# Patient Record
Sex: FEMALE | Race: WHITE | NOT HISPANIC OR LATINO | Employment: OTHER | URBAN - METROPOLITAN AREA
[De-identification: names, ages, dates, MRNs, and addresses within clinical notes are randomized per-mention and may not be internally consistent; named-entity substitution may affect disease eponyms.]

---

## 2017-02-07 ENCOUNTER — HOSPITAL ENCOUNTER (OUTPATIENT)
Dept: CT IMAGING | Facility: HOSPITAL | Age: 82
Discharge: HOME | End: 2017-02-07
Attending: INTERNAL MEDICINE

## 2017-02-07 ENCOUNTER — HOSPITAL ENCOUNTER (OUTPATIENT)
Dept: CARDIOLOGY | Facility: HOSPITAL | Age: 82
Discharge: HOME | End: 2017-02-07
Attending: INTERNAL MEDICINE

## 2017-02-07 DIAGNOSIS — Z01.818 EXAMINATION PRIOR TO CHEMOTHERAPY: ICD-10-CM

## 2017-02-07 DIAGNOSIS — C50.919 BREAST CANCER (H): ICD-10-CM

## 2017-02-07 LAB
AORTIC ROOT: 2.6 CM
AORTIC ROOT: 2.6 CM
AORTIC VALVE MEAN VELOCITY: 117 CM/S
AR DECEL SLOPE: 3000 MM/S2
AR PEAK VELOCITY: 411 CM/S
AV DIMENSIONLESS INDEX VTI: 0.8
AV MEAN GRADIENT: 6 MMHG
AV PEAK GRADIENT: 9.7 MMHG
AV REGURGITANT PEAK GRADIENT: 67.6 MMHG
AV REGURGITATION PRESSURE HALF TIME: 402 MS
AV VALVE AREA: 2.1 CM2
AV VELOCITY RATIO: 0.8
BSA FOR ECHO PROCEDURE: 1.51 M2
CV ECHO HEIGHT: 65 IN
CV ECHO WEIGHT: 110 LBS
DOP CALC AO PEAK VEL: 156 CM/S
DOP CALC AO VTI: 38.9 CM
DOP CALC LVOT AREA: 2.54 CM2
DOP CALC LVOT DIAMETER: 1.8 CM
DOP CALC LVOT PEAK VEL: 124 CM/S
DOP CALC LVOT STROKE VOLUME: 81.4 CM3
DOP CALCLVOT PEAK VEL VTI: 32 CM
EJECTION FRACTION: 59 % (ref 55–75)
FRACTIONAL SHORTENING: 25.6 % (ref 28–44)
INTERVENTRICULAR SEPTUM IN END DIASTOLE: 0.9 CM (ref 0.6–0.9)
IVS/PW RATIO: 1
LA AREA 1: 14.9 CM2
LA AREA 2: 9.14 CM2
LEFT ATRIUM LENGTH: 4.59 CM
LEFT ATRIUM SIZE: 2.8 CM
LEFT ATRIUM VOLUME INDEX: 16.7 ML/M2
LEFT ATRIUM VOLUME: 25.2 CM3
LEFT VENTRICLE CARDIAC INDEX: 4.1 L/MIN/M2
LEFT VENTRICLE CARDIAC OUTPUT: 6.2 L/MIN
LEFT VENTRICLE DIASTOLIC VOLUME INDEX: 40.4 CM3/M2 (ref 34–74)
LEFT VENTRICLE DIASTOLIC VOLUME: 61 CM3 (ref 46–106)
LEFT VENTRICLE HEART RATE: 76 BPM
LEFT VENTRICLE MASS INDEX: 69.8 G/M2
LEFT VENTRICLE SYSTOLIC VOLUME INDEX: 16.6 CM3/M2 (ref 11–31)
LEFT VENTRICLE SYSTOLIC VOLUME: 25 CM3 (ref 14–42)
LEFT VENTRICULAR INTERNAL DIMENSION IN DIASTOLE: 3.9 CM (ref 3.8–5.2)
LEFT VENTRICULAR INTERNAL DIMENSION IN SYSTOLE: 2.9 CM (ref 2.2–3.5)
LEFT VENTRICULAR MASS: 105.3 G
LEFT VENTRICULAR OUTFLOW TRACT MEAN GRADIENT: 4 MMHG
LEFT VENTRICULAR OUTFLOW TRACT MEAN VELOCITY: 92.3 CM/S
LEFT VENTRICULAR OUTFLOW TRACT PEAK GRADIENT: 6 MMHG
LEFT VENTRICULAR POSTERIOR WALL IN END DIASTOLE: 0.9 CM (ref 0.6–0.9)
LV STROKE VOLUME INDEX: 53.9 ML/M2
MITRAL VALVE E/A RATIO: 0.9
MV AVERAGE E/E' RATIO: 11.3 CM/S
MV DECELERATION TIME: 180 MS
MV E'TISSUE VEL-LAT: 8.77 CM/S
MV E'TISSUE VEL-MED: 7.7 CM/S
MV LATERAL E/E' RATIO: 10.6
MV MEDIAL E/E' RATIO: 12.1
MV PEAK A VELOCITY: 101 CM/S
MV PEAK E VELOCITY: 93.3 CM/S
NUC REST DIASTOLIC VOLUME INDEX: 1760 LBS
NUC REST SYSTOLIC VOLUME INDEX: 65 IN
PR MAX PG: 4 MMHG
PR PEAK VELOCITY: 90.9 CM/S

## 2017-02-07 ASSESSMENT — MIFFLIN-ST. JEOR
SCORE: 949.84
SCORE: 870.46

## 2017-02-16 ENCOUNTER — RECORDS - HEALTHEAST (OUTPATIENT)
Dept: ADMINISTRATIVE | Facility: OTHER | Age: 82
End: 2017-02-16

## 2017-02-17 ENCOUNTER — RECORDS - HEALTHEAST (OUTPATIENT)
Dept: ADMINISTRATIVE | Facility: OTHER | Age: 82
End: 2017-02-17

## 2017-05-04 ENCOUNTER — HOSPITAL ENCOUNTER (OUTPATIENT)
Dept: CARDIOLOGY | Facility: HOSPITAL | Age: 82
Discharge: HOME | End: 2017-05-04
Attending: INTERNAL MEDICINE

## 2017-05-04 ENCOUNTER — HOSPITAL ENCOUNTER (OUTPATIENT)
Dept: CT IMAGING | Facility: HOSPITAL | Age: 82
Setting detail: RADIATION/ONCOLOGY SERIES
Discharge: STILL ADMITTED OR ADMINISTRATIVE | End: 2017-05-04
Attending: INTERNAL MEDICINE

## 2017-05-04 DIAGNOSIS — Z01.818 EXAMINATION PRIOR TO CHEMOTHERAPY: ICD-10-CM

## 2017-05-04 DIAGNOSIS — C50.919 BREAST CANCER (H): ICD-10-CM

## 2017-05-04 LAB
AORTIC ROOT: 2.6 CM
AORTIC VALVE MEAN VELOCITY: 92.2 CM/S
AR DECEL SLOPE: 2810 MM/S2
AR PEAK VELOCITY: 364 CM/S
AV DIMENSIONLESS INDEX VTI: 0.9
AV MEAN GRADIENT: 4 MMHG
AV PEAK GRADIENT: 6.7 MMHG
AV REGURGITANT PEAK GRADIENT: 53 MMHG
AV REGURGITATION PRESSURE HALF TIME: 383 MS
AV VALVE AREA: 2.8 CM2
AV VELOCITY RATIO: 0.9
BSA FOR ECHO PROCEDURE: 1.51 M2
CV ECHO HEIGHT: 65 IN
CV ECHO WEIGHT: 110 LBS
DOP CALC AO PEAK VEL: 129 CM/S
DOP CALC AO VTI: 32.7 CM
DOP CALC LVOT AREA: 3.14 CM2
DOP CALC LVOT DIAMETER: 2 CM
DOP CALC LVOT PEAK VEL: 110 CM/S
DOP CALC LVOT STROKE VOLUME: 90.7 CM3
DOP CALCLVOT PEAK VEL VTI: 28.9 CM
EJECTION FRACTION: 50 % (ref 55–75)
FRACTIONAL SHORTENING: 42.9 % (ref 28–44)
INTERVENTRICULAR SEPTUM IN END DIASTOLE: 0.82 CM (ref 0.6–0.9)
IVS/PW RATIO: 0.7
LA AREA 1: 8.9 CM2
LA AREA 2: 10.3 CM2
LEFT ATRIUM LENGTH: 2.8 CM
LEFT ATRIUM SIZE: 2.5 CM
LEFT ATRIUM TO AORTIC ROOT RATIO: 0.96 NO UNITS
LEFT ATRIUM VOLUME INDEX: 18.4 ML/M2
LEFT ATRIUM VOLUME: 27.8 CM3
LEFT VENTRICLE CARDIAC INDEX: 4.9 L/MIN/M2
LEFT VENTRICLE CARDIAC OUTPUT: 7.4 L/MIN
LEFT VENTRICLE DIASTOLIC VOLUME INDEX: 51.5 CM3/M2 (ref 34–74)
LEFT VENTRICLE DIASTOLIC VOLUME: 77.7 CM3 (ref 46–106)
LEFT VENTRICLE HEART RATE: 81 BPM
LEFT VENTRICLE MASS INDEX: 91 G/M2
LEFT VENTRICLE SYSTOLIC VOLUME INDEX: 25.5 CM3/M2 (ref 11–31)
LEFT VENTRICLE SYSTOLIC VOLUME: 38.5 CM3 (ref 14–42)
LEFT VENTRICULAR INTERNAL DIMENSION IN DIASTOLE: 4.22 CM (ref 3.8–5.2)
LEFT VENTRICULAR INTERNAL DIMENSION IN SYSTOLE: 2.41 CM (ref 2.2–3.5)
LEFT VENTRICULAR MASS: 137.4 G
LEFT VENTRICULAR OUTFLOW TRACT MEAN GRADIENT: 3 MMHG
LEFT VENTRICULAR OUTFLOW TRACT MEAN VELOCITY: 77.1 CM/S
LEFT VENTRICULAR OUTFLOW TRACT PEAK GRADIENT: 5 MMHG
LEFT VENTRICULAR POSTERIOR WALL IN END DIASTOLE: 1.17 CM (ref 0.6–0.9)
LV STROKE VOLUME INDEX: 60.1 ML/M2
MITRAL VALVE E/A RATIO: 1.1
MV AVERAGE E/E' RATIO: 10.4 CM/S
MV DECELERATION TIME: 158 MS
MV E'TISSUE VEL-LAT: 7.64 CM/S
MV E'TISSUE VEL-MED: 10.5 CM/S
MV LATERAL E/E' RATIO: 12.4
MV MEDIAL E/E' RATIO: 9
MV PEAK A VELOCITY: 83.9 CM/S
MV PEAK E VELOCITY: 94.6 CM/S
NUC REST DIASTOLIC VOLUME INDEX: 1760 LBS
NUC REST SYSTOLIC VOLUME INDEX: 65 IN
RIGHT VENTRICULAR INTERNAL DIMENSION IN DYSTOLE: 1.92 CM
TRICUSPID VALVE ANULAR PLANE SYSTOLIC EXCURSION: 1.8 CM

## 2017-05-04 ASSESSMENT — MIFFLIN-ST. JEOR: SCORE: 949.84

## 2017-05-05 ENCOUNTER — COMMUNICATION - HEALTHEAST (OUTPATIENT)
Dept: INTERVENTIONAL RADIOLOGY/VASCULAR | Facility: HOSPITAL | Age: 82
End: 2017-05-05

## 2017-05-11 ENCOUNTER — RECORDS - HEALTHEAST (OUTPATIENT)
Dept: ADMINISTRATIVE | Facility: OTHER | Age: 82
End: 2017-05-11

## 2017-05-17 ENCOUNTER — HOSPITAL ENCOUNTER (OUTPATIENT)
Dept: ULTRASOUND IMAGING | Facility: HOSPITAL | Age: 82
Discharge: HOME | End: 2017-05-17
Attending: INTERNAL MEDICINE

## 2017-05-17 DIAGNOSIS — K82.8 THICKENING OF WALL OF GALLBLADDER: ICD-10-CM

## 2017-07-27 ENCOUNTER — HOSPITAL ENCOUNTER (OUTPATIENT)
Dept: CARDIOLOGY | Facility: HOSPITAL | Age: 82
Discharge: HOME | End: 2017-07-27
Attending: INTERNAL MEDICINE

## 2017-07-27 DIAGNOSIS — Z01.818 EXAMINATION PRIOR TO CHEMOTHERAPY: ICD-10-CM

## 2017-07-27 LAB
AORTIC ROOT: 2.5 CM
AR DECEL SLOPE: 3370 MM/S2
AR PEAK VELOCITY: 416 CM/S
AV REGURGITANT PEAK GRADIENT: 69.2 MMHG
AV REGURGITATION PRESSURE HALF TIME: 322 MS
BSA FOR ECHO PROCEDURE: 1.51 M2
CV ECHO HEIGHT: 65 IN
CV ECHO WEIGHT: 110 LBS
DOP CALC LVOT AREA: 2.83 CM2
DOP CALC LVOT DIAMETER: 1.9 CM
DOP CALC LVOT PEAK VEL: 117 CM/S
DOP CALC LVOT STROKE VOLUME: 78.5 CM3
DOP CALCLVOT PEAK VEL VTI: 27.7 CM
EJECTION FRACTION: 58 % (ref 55–75)
FRACTIONAL SHORTENING: 31.8 % (ref 28–44)
INTERVENTRICULAR SEPTUM IN END DIASTOLE: 1.08 CM (ref 0.6–0.9)
IVS/PW RATIO: 1.2
LA AREA 1: 10.9 CM2
LA AREA 2: 6.4 CM2
LEFT ATRIUM LENGTH: 3 CM
LEFT ATRIUM SIZE: 2.4 CM
LEFT ATRIUM VOLUME INDEX: 13.1 ML/M2
LEFT ATRIUM VOLUME: 19.8 CM3
LEFT VENTRICLE CARDIAC INDEX: 3.8 L/MIN/M2
LEFT VENTRICLE CARDIAC OUTPUT: 5.8 L/MIN
LEFT VENTRICLE DIASTOLIC VOLUME INDEX: 31.9 CM3/M2 (ref 34–74)
LEFT VENTRICLE DIASTOLIC VOLUME: 48.2 CM3 (ref 46–106)
LEFT VENTRICLE HEART RATE: 74 BPM
LEFT VENTRICLE MASS INDEX: 71.9 G/M2
LEFT VENTRICLE SYSTOLIC VOLUME INDEX: 13.4 CM3/M2 (ref 11–31)
LEFT VENTRICLE SYSTOLIC VOLUME: 20.2 CM3 (ref 14–42)
LEFT VENTRICULAR INTERNAL DIMENSION IN DIASTOLE: 3.59 CM (ref 3.8–5.2)
LEFT VENTRICULAR INTERNAL DIMENSION IN SYSTOLE: 2.45 CM (ref 2.2–3.5)
LEFT VENTRICULAR MASS: 108.6 G
LEFT VENTRICULAR OUTFLOW TRACT MEAN GRADIENT: 3 MMHG
LEFT VENTRICULAR OUTFLOW TRACT MEAN VELOCITY: 81.3 CM/S
LEFT VENTRICULAR OUTFLOW TRACT PEAK GRADIENT: 5 MMHG
LEFT VENTRICULAR POSTERIOR WALL IN END DIASTOLE: 0.94 CM (ref 0.6–0.9)
LV STROKE VOLUME INDEX: 52 ML/M2
MITRAL VALVE E/A RATIO: 0.8
MV AVERAGE E/E' RATIO: 12.2 CM/S
MV DECELERATION TIME: 158 MS
MV E'TISSUE VEL-LAT: 7.93 CM/S
MV E'TISSUE VEL-MED: 9.09 CM/S
MV LATERAL E/E' RATIO: 13.1
MV MEDIAL E/E' RATIO: 11.4
MV PEAK A VELOCITY: 125 CM/S
MV PEAK E VELOCITY: 104 CM/S
NUC REST DIASTOLIC VOLUME INDEX: 1760 LBS
NUC REST SYSTOLIC VOLUME INDEX: 65 IN
PR MAX PG: 3 MMHG
PR PEAK VELOCITY: 88 CM/S
TRICUSPID REGURGITATION PEAK PRESSURE GRADIENT: 14.4 MMHG
TRICUSPID VALVE ANULAR PLANE SYSTOLIC EXCURSION: 2.4 CM
TRICUSPID VALVE PEAK REGURGITANT VELOCITY: 190 CM/S

## 2017-07-27 ASSESSMENT — MIFFLIN-ST. JEOR: SCORE: 949.84

## 2017-08-03 ENCOUNTER — RECORDS - HEALTHEAST (OUTPATIENT)
Dept: ADMINISTRATIVE | Facility: OTHER | Age: 82
End: 2017-08-03

## 2017-08-17 ENCOUNTER — AMBULATORY - HEALTHEAST (OUTPATIENT)
Dept: CARDIOLOGY | Facility: CLINIC | Age: 82
End: 2017-08-17

## 2017-11-09 ENCOUNTER — HOSPITAL ENCOUNTER (OUTPATIENT)
Dept: CARDIOLOGY | Facility: HOSPITAL | Age: 82
Discharge: HOME | End: 2017-11-09
Attending: INTERNAL MEDICINE

## 2017-11-09 DIAGNOSIS — Z01.818 EXAMINATION PRIOR TO CHEMOTHERAPY: ICD-10-CM

## 2017-11-09 LAB
AORTIC ROOT: 2.8 CM
AORTIC VALVE MEAN VELOCITY: 127 CM/S
AR DECEL SLOPE: 3050 MM/S2
AR PEAK VELOCITY: 413 CM/S
AV DIMENSIONLESS INDEX VTI: 0.7
AV MEAN GRADIENT: 7 MMHG
AV PEAK GRADIENT: 10.8 MMHG
AV REGURGITANT PEAK GRADIENT: 68.2 MMHG
AV REGURGITATION PRESSURE HALF TIME: 396 MS
AV VALVE AREA: 1.9 CM2
AV VELOCITY RATIO: 0.7
BSA FOR ECHO PROCEDURE: 1.51 M2
CV BLOOD PRESSURE: NORMAL MMHG
CV ECHO HEIGHT: 65 IN
CV ECHO WEIGHT: 110 LBS
DOP CALC AO PEAK VEL: 164 CM/S
DOP CALC AO VTI: 40.2 CM
DOP CALC LVOT AREA: 2.83 CM2
DOP CALC LVOT DIAMETER: 1.9 CM
DOP CALC LVOT PEAK VEL: 112 CM/S
DOP CALC LVOT STROKE VOLUME: 78.2 CM3
DOP CALCLVOT PEAK VEL VTI: 27.6 CM
EJECTION FRACTION: 65 % (ref 55–75)
FRACTIONAL SHORTENING: 22.3 % (ref 28–44)
INTERVENTRICULAR SEPTUM IN END DIASTOLE: 0.91 CM (ref 0.6–0.9)
IVS/PW RATIO: 0.9
LA AREA 1: 10 CM2
LA AREA 2: 11 CM2
LEFT ATRIUM LENGTH: 3.7 CM
LEFT ATRIUM SIZE: 2.5 CM
LEFT ATRIUM VOLUME INDEX: 16.7 ML/M2
LEFT ATRIUM VOLUME: 25.3 CM3
LEFT VENTRICLE CARDIAC INDEX: 3.1 L/MIN/M2
LEFT VENTRICLE CARDIAC OUTPUT: 4.7 L/MIN
LEFT VENTRICLE DIASTOLIC VOLUME INDEX: 44.5 CM3/M2 (ref 34–74)
LEFT VENTRICLE DIASTOLIC VOLUME: 67.2 CM3 (ref 46–106)
LEFT VENTRICLE HEART RATE: 60 BPM
LEFT VENTRICLE MASS INDEX: 87.3 G/M2
LEFT VENTRICLE SYSTOLIC VOLUME INDEX: 15.6 CM3/M2 (ref 11–31)
LEFT VENTRICLE SYSTOLIC VOLUME: 23.6 CM3 (ref 14–42)
LEFT VENTRICULAR INTERNAL DIMENSION IN DIASTOLE: 4.21 CM (ref 3.8–5.2)
LEFT VENTRICULAR INTERNAL DIMENSION IN SYSTOLE: 3.27 CM (ref 2.2–3.5)
LEFT VENTRICULAR MASS: 131.9 G
LEFT VENTRICULAR OUTFLOW TRACT MEAN GRADIENT: 3 MMHG
LEFT VENTRICULAR OUTFLOW TRACT MEAN VELOCITY: 89 CM/S
LEFT VENTRICULAR OUTFLOW TRACT PEAK GRADIENT: 5 MMHG
LEFT VENTRICULAR POSTERIOR WALL IN END DIASTOLE: 1.03 CM (ref 0.6–0.9)
LV STROKE VOLUME INDEX: 51.8 ML/M2
MITRAL VALVE DECELERATION SLOPE: 1380 MM/S2
MITRAL VALVE E/A RATIO: 0.9
MITRAL VALVE PRESSURE HALF-TIME: 195 MS
MV AVERAGE E/E' RATIO: 10.5 CM/S
MV DECELERATION TIME: 162 MS
MV E'TISSUE VEL-LAT: 7.45 CM/S
MV E'TISSUE VEL-MED: 10.5 CM/S
MV LATERAL E/E' RATIO: 12.6
MV MEDIAL E/E' RATIO: 9
MV PEAK A VELOCITY: 101 CM/S
MV PEAK E VELOCITY: 94.1 CM/S
MV VALVE AREA PRESSURE 1/2 METHOD: 1.1 CM2
NUC REST DIASTOLIC VOLUME INDEX: 1760 LBS
NUC REST SYSTOLIC VOLUME INDEX: 65 IN
PR MAX PG: 4 MMHG
PR PEAK VELOCITY: 109 CM/S
TRICUSPID REGURGITATION PEAK PRESSURE GRADIENT: 18.8 MMHG
TRICUSPID VALVE ANULAR PLANE SYSTOLIC EXCURSION: 2 CM
TRICUSPID VALVE PEAK REGURGITANT VELOCITY: 217 CM/S

## 2017-11-09 ASSESSMENT — MIFFLIN-ST. JEOR: SCORE: 949.84

## 2017-11-17 ENCOUNTER — RECORDS - HEALTHEAST (OUTPATIENT)
Dept: ADMINISTRATIVE | Facility: OTHER | Age: 82
End: 2017-11-17

## 2018-02-01 ENCOUNTER — HOSPITAL ENCOUNTER (OUTPATIENT)
Dept: CARDIOLOGY | Facility: HOSPITAL | Age: 83
Discharge: HOME | End: 2018-02-01
Attending: INTERNAL MEDICINE

## 2018-02-01 DIAGNOSIS — Z01.818 EXAMINATION PRIOR TO CHEMOTHERAPY: ICD-10-CM

## 2018-02-01 LAB
AORTIC ROOT: 2.7 CM
AORTIC VALVE MEAN VELOCITY: 112 CM/S
AV DIMENSIONLESS INDEX VTI: 0.7
AV MEAN GRADIENT: 6 MMHG
AV PEAK GRADIENT: 10.5 MMHG
AV VALVE AREA: 2.1 CM2
BSA FOR ECHO PROCEDURE: 1.51 M2
CV BLOOD PRESSURE: NORMAL MMHG
CV ECHO HEIGHT: 65 IN
CV ECHO WEIGHT: 110 LBS
DOP CALC AO PEAK VEL: 162 CM/S
DOP CALC AO VTI: 37.8 CM
DOP CALC LVOT AREA: 2.83 CM2
DOP CALC LVOT DIAMETER: 1.9 CM
DOP CALC LVOT STROKE VOLUME: 79.1 CM3
DOP CALCLVOT PEAK VEL VTI: 27.9 CM
EJECTION FRACTION: 57 % (ref 55–75)
FRACTIONAL SHORTENING: 28.6 % (ref 28–44)
INTERVENTRICULAR SEPTUM IN END DIASTOLE: 1.1 CM (ref 0.6–0.9)
IVS/PW RATIO: 1.1
LA AREA 1: 12.4 CM2
LA AREA 2: 12.8 CM2
LEFT ATRIUM LENGTH: 4.77 CM
LEFT ATRIUM SIZE: 2.5 CM
LEFT ATRIUM VOLUME INDEX: 18.7 ML/M2
LEFT ATRIUM VOLUME: 28.3 ML
LEFT VENTRICLE DIASTOLIC VOLUME INDEX: 40.4 CM3/M2 (ref 34–74)
LEFT VENTRICLE DIASTOLIC VOLUME: 61 CM3 (ref 46–106)
LEFT VENTRICLE MASS INDEX: 73.5 G/M2
LEFT VENTRICLE SYSTOLIC VOLUME INDEX: 17.2 CM3/M2 (ref 11–31)
LEFT VENTRICLE SYSTOLIC VOLUME: 26 CM3 (ref 14–42)
LEFT VENTRICULAR INTERNAL DIMENSION IN DIASTOLE: 3.5 CM (ref 3.8–5.2)
LEFT VENTRICULAR INTERNAL DIMENSION IN SYSTOLE: 2.5 CM (ref 2.2–3.5)
LEFT VENTRICULAR MASS: 111 G
LEFT VENTRICULAR OUTFLOW TRACT MEAN GRADIENT: 3 MMHG
LEFT VENTRICULAR OUTFLOW TRACT MEAN VELOCITY: 74.6 CM/S
LEFT VENTRICULAR POSTERIOR WALL IN END DIASTOLE: 1 CM (ref 0.6–0.9)
LV STROKE VOLUME INDEX: 52.4 ML/M2
MITRAL VALVE E/A RATIO: 1.1
MV AVERAGE E/E' RATIO: 11.6 CM/S
MV DECELERATION TIME: 204 MS
MV E'TISSUE VEL-LAT: 7.8 CM/S
MV E'TISSUE VEL-MED: 7.31 CM/S
MV LATERAL E/E' RATIO: 11.3
MV MEDIAL E/E' RATIO: 12
MV PEAK A VELOCITY: 80.5 CM/S
MV PEAK E VELOCITY: 87.9 CM/S
NUC REST DIASTOLIC VOLUME INDEX: 1760 LBS
NUC REST SYSTOLIC VOLUME INDEX: 65 IN
PR MAX PG: 4 MMHG
PR PEAK VELOCITY: 101 CM/S
PV PEAK GRADIENT: 1.7 MMHG
PV VMAX: 65.9 CM/S
TRICUSPID REGURGITATION PEAK PRESSURE GRADIENT: 21.5 MMHG
TRICUSPID VALVE ANULAR PLANE SYSTOLIC EXCURSION: 1.7 CM
TRICUSPID VALVE PEAK REGURGITANT VELOCITY: 232 CM/S

## 2018-02-01 ASSESSMENT — MIFFLIN-ST. JEOR: SCORE: 949.84

## 2018-04-16 ENCOUNTER — RECORDS - HEALTHEAST (OUTPATIENT)
Dept: ADMINISTRATIVE | Facility: OTHER | Age: 83
End: 2018-04-16

## 2018-06-14 ENCOUNTER — HOSPITAL ENCOUNTER (OUTPATIENT)
Dept: CARDIOLOGY | Facility: HOSPITAL | Age: 83
Discharge: HOME | End: 2018-06-14
Attending: INTERNAL MEDICINE

## 2018-06-14 DIAGNOSIS — Z01.818 EXAMINATION PRIOR TO CHEMOTHERAPY: ICD-10-CM

## 2018-06-14 LAB
AORTIC ROOT: 2.9 CM
AR DECEL SLOPE: 3920 MM/S2
AR PEAK VELOCITY: 444 CM/S
AV REGURGITANT PEAK GRADIENT: 78.9 MMHG
AV REGURGITATION PRESSURE HALF TIME: 332 MS
BSA FOR ECHO PROCEDURE: 1.51 M2
CV BLOOD PRESSURE: NORMAL MMHG
CV ECHO HEIGHT: 65 IN
CV ECHO WEIGHT: 110 LBS
DOP CALC LVOT AREA: 2.83 CM2
DOP CALC LVOT DIAMETER: 1.9 CM
DOP CALC LVOT STROKE VOLUME: 73.4 CM3
DOP CALCLVOT PEAK VEL VTI: 25.9 CM
EJECTION FRACTION: 70 % (ref 55–75)
FRACTIONAL SHORTENING: 26.3 % (ref 28–44)
INTERVENTRICULAR SEPTUM IN END DIASTOLE: 1 CM (ref 0.6–0.9)
IVS/PW RATIO: 1
LA AREA 1: 10.9 CM2
LA AREA 2: 9.2 CM2
LEFT ATRIUM LENGTH: 4.4 CM
LEFT ATRIUM SIZE: 2.6 CM
LEFT ATRIUM VOLUME INDEX: 12.8 ML/M2
LEFT ATRIUM VOLUME: 19.4 ML
LEFT VENTRICLE CARDIAC INDEX: 3.8 L/MIN/M2
LEFT VENTRICLE CARDIAC OUTPUT: 5.7 L/MIN
LEFT VENTRICLE DIASTOLIC VOLUME INDEX: 35.1 CM3/M2 (ref 34–74)
LEFT VENTRICLE DIASTOLIC VOLUME: 53 CM3 (ref 46–106)
LEFT VENTRICLE HEART RATE: 78 BPM
LEFT VENTRICLE MASS INDEX: 77.7 G/M2
LEFT VENTRICLE SYSTOLIC VOLUME INDEX: 10.6 CM3/M2 (ref 11–31)
LEFT VENTRICLE SYSTOLIC VOLUME: 16 CM3 (ref 14–42)
LEFT VENTRICULAR INTERNAL DIMENSION IN DIASTOLE: 3.8 CM (ref 3.8–5.2)
LEFT VENTRICULAR INTERNAL DIMENSION IN SYSTOLE: 2.8 CM (ref 2.2–3.5)
LEFT VENTRICULAR MASS: 117.3 G
LEFT VENTRICULAR OUTFLOW TRACT MEAN GRADIENT: 3 MMHG
LEFT VENTRICULAR OUTFLOW TRACT MEAN VELOCITY: 77.7 CM/S
LEFT VENTRICULAR POSTERIOR WALL IN END DIASTOLE: 1 CM (ref 0.6–0.9)
LV STROKE VOLUME INDEX: 48.6 ML/M2
MITRAL REGURGITANT VELOCITY TIME INTEGRAL: 221 CM
MITRAL VALVE E/A RATIO: 0.9
MR FLOW: 11 CM3
MR MEAN GRADIENT: 102 MMHG
MR MEAN VELOCITY: 485 CM/S
MR PEAK GRADIENT: 133.2 MMHG
MR PISA EROA: 0.1 CM2
MR PISA RADIUS: 0.4 CM
MR PISA VN NYQUIST: 30.8 CM/S
MV AVERAGE E/E' RATIO: 14.1 CM/S
MV DECELERATION TIME: 183 MS
MV E'TISSUE VEL-LAT: 6.92 CM/S
MV E'TISSUE VEL-MED: 7.99 CM/S
MV LATERAL E/E' RATIO: 15.2
MV MEDIAL E/E' RATIO: 13.1
MV PEAK A VELOCITY: 121 CM/S
MV PEAK E VELOCITY: 105 CM/S
MV REGURGITANT VOLUME: 11.9 CC
NUC REST DIASTOLIC VOLUME INDEX: 1760 LBS
NUC REST SYSTOLIC VOLUME INDEX: 65 IN
PISA MR PEAK VEL: 577 CM/S
PR MAX PG: 5 MMHG
PR PEAK VELOCITY: 107 CM/S
TRICUSPID REGURGITATION PEAK PRESSURE GRADIENT: 15.1 MMHG
TRICUSPID VALVE ANULAR PLANE SYSTOLIC EXCURSION: 1.8 CM
TRICUSPID VALVE PEAK REGURGITANT VELOCITY: 194 CM/S

## 2018-06-14 ASSESSMENT — MIFFLIN-ST. JEOR: SCORE: 949.84

## 2018-09-13 ENCOUNTER — RECORDS - HEALTHEAST (OUTPATIENT)
Dept: ADMINISTRATIVE | Facility: OTHER | Age: 83
End: 2018-09-13

## 2018-11-29 ENCOUNTER — HOSPITAL ENCOUNTER (OUTPATIENT)
Dept: NUCLEAR MEDICINE | Facility: HOSPITAL | Age: 83
Discharge: HOME | End: 2018-11-29
Attending: INTERNAL MEDICINE

## 2018-11-29 DIAGNOSIS — I42.9 FAMILIAL CARDIOMYOPATHY (H): ICD-10-CM

## 2018-11-29 LAB
MUGA LV EJECTION FRACTION: 63.5 %
MUGA PRIOR EJECTION FRACTION: 61 %

## 2019-06-13 ENCOUNTER — HOSPITAL ENCOUNTER (OUTPATIENT)
Dept: NUCLEAR MEDICINE | Facility: HOSPITAL | Age: 84
Discharge: HOME | End: 2019-06-13
Attending: INTERNAL MEDICINE

## 2019-06-13 ENCOUNTER — RECORDS - HEALTHEAST (OUTPATIENT)
Dept: ADMINISTRATIVE | Facility: OTHER | Age: 84
End: 2019-06-13

## 2019-06-13 DIAGNOSIS — I42.9 CARDIOMYOPATHY (H): ICD-10-CM

## 2019-06-13 LAB
MUGA LV EJECTION FRACTION: 62.3 %
MUGA PRIOR EJECTION FRACTION: 63.5 %

## 2019-09-06 ENCOUNTER — RECORDS - HEALTHEAST (OUTPATIENT)
Dept: ADMINISTRATIVE | Facility: OTHER | Age: 84
End: 2019-09-06

## 2019-09-16 ENCOUNTER — HOSPITAL ENCOUNTER (OUTPATIENT)
Dept: CT IMAGING | Facility: HOSPITAL | Age: 84
Discharge: HOME | End: 2019-09-16
Attending: INTERNAL MEDICINE

## 2019-09-16 ENCOUNTER — HOSPITAL ENCOUNTER (OUTPATIENT)
Dept: NUCLEAR MEDICINE | Facility: HOSPITAL | Age: 84
Discharge: HOME | End: 2019-09-16
Attending: INTERNAL MEDICINE

## 2019-09-16 DIAGNOSIS — C50.411 BREAST CANCER OF UPPER-OUTER QUADRANT OF RIGHT FEMALE BREAST (H): ICD-10-CM

## 2019-09-16 LAB
CREAT BLD-MCNC: 1.1 MG/DL (ref 0.6–1.1)
GFR SERPL CREATININE-BSD FRML MDRD: 48 ML/MIN/1.73M2

## 2019-12-19 ENCOUNTER — RECORDS - HEALTHEAST (OUTPATIENT)
Dept: ADMINISTRATIVE | Facility: OTHER | Age: 84
End: 2019-12-19

## 2019-12-20 ENCOUNTER — RECORDS - HEALTHEAST (OUTPATIENT)
Dept: ADMINISTRATIVE | Facility: OTHER | Age: 84
End: 2019-12-20

## 2019-12-20 ENCOUNTER — HOSPITAL ENCOUNTER (OUTPATIENT)
Dept: CARDIOLOGY | Facility: HOSPITAL | Age: 84
Discharge: HOME | End: 2019-12-20
Attending: INTERNAL MEDICINE

## 2019-12-20 DIAGNOSIS — Z01.818 EXAMINATION PRIOR TO CHEMOTHERAPY: ICD-10-CM

## 2019-12-20 ASSESSMENT — MIFFLIN-ST. JEOR: SCORE: 972.52

## 2019-12-23 LAB
AORTIC ROOT: 2.5 CM
AORTIC VALVE MEAN VELOCITY: 122 CM/S
AR DECEL SLOPE: 3540 MM/S2
AR PEAK VELOCITY: 453 CM/S
AV DIMENSIONLESS INDEX VTI: 0.8
AV MEAN GRADIENT: 7 MMHG
AV PEAK GRADIENT: 12.3 MMHG
AV REGURGITANT PEAK GRADIENT: 82.1 MMHG
AV REGURGITATION PRESSURE HALF TIME: 375 MS
AV VALVE AREA: 2.2 CM2
AV VELOCITY RATIO: 0.7
BSA FOR ECHO PROCEDURE: 1.55 M2
CV ECHO HEIGHT: 65 IN
CV ECHO WEIGHT: 115 LBS
DOP CALC AO PEAK VEL: 175 CM/S
DOP CALC AO VTI: 34.4 CM
DOP CALC LVOT AREA: 2.83 CM2
DOP CALC LVOT DIAMETER: 1.9 CM
DOP CALC LVOT PEAK VEL: 122 CM/S
DOP CALC LVOT STROKE VOLUME: 75.4 CM3
DOP CALC MV VTI: 25 CM
DOP CALCLVOT PEAK VEL VTI: 26.6 CM
ECHO EJECTION FRACTION ESTIMATED: 65 %
EJECTION FRACTION: 59 % (ref 55–75)
FRACTIONAL SHORTENING: 38.7 % (ref 28–44)
INTERVENTRICULAR SEPTUM IN END DIASTOLE: 0.97 CM (ref 0.6–0.9)
IVS/PW RATIO: 0.9
LA AREA 1: 12.7 CM2
LEFT ATRIUM LENGTH: 3.9 CM
LEFT ATRIUM SIZE: 2.4 CM
LEFT ATRIUM TO AORTIC ROOT RATIO: 0.96 NO UNITS
LEFT VENTRICLE CARDIAC INDEX: 3.9 L/MIN/M2
LEFT VENTRICLE CARDIAC OUTPUT: 6.1 L/MIN
LEFT VENTRICLE DIASTOLIC VOLUME INDEX: 40.6 CM3/M2 (ref 29–61)
LEFT VENTRICLE DIASTOLIC VOLUME: 62.9 CM3 (ref 46–106)
LEFT VENTRICLE HEART RATE: 81 BPM
LEFT VENTRICLE MASS INDEX: 67.2 G/M2
LEFT VENTRICLE SYSTOLIC VOLUME INDEX: 16.7 CM3/M2 (ref 8–24)
LEFT VENTRICLE SYSTOLIC VOLUME: 25.9 CM3 (ref 14–42)
LEFT VENTRICULAR INTERNAL DIMENSION IN DIASTOLE: 3.41 CM (ref 3.8–5.2)
LEFT VENTRICULAR INTERNAL DIMENSION IN SYSTOLE: 2.09 CM (ref 2.2–3.5)
LEFT VENTRICULAR MASS: 104.2 G
LEFT VENTRICULAR OUTFLOW TRACT MEAN GRADIENT: 3 MMHG
LEFT VENTRICULAR OUTFLOW TRACT MEAN VELOCITY: 86.1 CM/S
LEFT VENTRICULAR OUTFLOW TRACT PEAK GRADIENT: 6 MMHG
LEFT VENTRICULAR POSTERIOR WALL IN END DIASTOLE: 1.1 CM (ref 0.6–0.9)
LV STROKE VOLUME INDEX: 48.6 ML/M2
MITRAL VALVE E/A RATIO: 0.7
MITRAL VALVE MEAN INFLOW VELOCITY: 60.5 CM/S
MITRAL VALVE PEAK VELOCITY: 102 CM/S
MV AREA VTI: 3.02 CM2
MV AVERAGE E/E' RATIO: 9 CM/S
MV DECELERATION TIME: 173 MS
MV E'TISSUE VEL-LAT: 8.51 CM/S
MV E'TISSUE VEL-MED: 8.22 CM/S
MV LATERAL E/E' RATIO: 8.8
MV MEAN GRADIENT: 2 MMHG
MV MEDIAL E/E' RATIO: 9.1
MV PEAK A VELOCITY: 115 CM/S
MV PEAK E VELOCITY: 75.2 CM/S
MV PEAK GRADIENT: 4.2 MMHG
MV VALVE AREA BY CONTINUITY EQUATION: 3 CM2
NUC REST DIASTOLIC VOLUME INDEX: 1840 LBS
NUC REST SYSTOLIC VOLUME INDEX: 65 IN
TRICUSPID VALVE ANULAR PLANE SYSTOLIC EXCURSION: 2 CM

## 2020-03-13 ENCOUNTER — RECORDS - HEALTHEAST (OUTPATIENT)
Dept: ADMINISTRATIVE | Facility: OTHER | Age: 85
End: 2020-03-13

## 2020-06-05 ENCOUNTER — RECORDS - HEALTHEAST (OUTPATIENT)
Dept: ADMINISTRATIVE | Facility: OTHER | Age: 85
End: 2020-06-05

## 2020-07-17 ENCOUNTER — RECORDS - HEALTHEAST (OUTPATIENT)
Dept: ADMINISTRATIVE | Facility: OTHER | Age: 85
End: 2020-07-17

## 2020-08-07 ENCOUNTER — RECORDS - HEALTHEAST (OUTPATIENT)
Dept: ADMINISTRATIVE | Facility: OTHER | Age: 85
End: 2020-08-07

## 2020-08-26 ENCOUNTER — HOSPITAL ENCOUNTER (OUTPATIENT)
Dept: CT IMAGING | Facility: HOSPITAL | Age: 85
Discharge: HOME | End: 2020-08-26
Attending: INTERNAL MEDICINE

## 2020-08-26 ENCOUNTER — HOSPITAL ENCOUNTER (OUTPATIENT)
Dept: NUCLEAR MEDICINE | Facility: HOSPITAL | Age: 85
Discharge: HOME | End: 2020-08-26
Attending: INTERNAL MEDICINE

## 2020-08-26 DIAGNOSIS — C50.919 PRIMARY MALIGNANT NEOPLASM OF FEMALE BREAST (H): ICD-10-CM

## 2020-08-26 LAB
CREAT BLD-MCNC: 1.2 MG/DL (ref 0.6–1.1)
GFR SERPL CREATININE-BSD FRML MDRD: 43 ML/MIN/1.73M2

## 2020-08-27 ENCOUNTER — HOSPITAL ENCOUNTER (OUTPATIENT)
Dept: NUCLEAR MEDICINE | Facility: HOSPITAL | Age: 85
Discharge: HOME | End: 2020-08-27
Attending: INTERNAL MEDICINE

## 2020-08-27 ENCOUNTER — HOSPITAL ENCOUNTER (OUTPATIENT)
Dept: CARDIOLOGY | Facility: HOSPITAL | Age: 85
Discharge: HOME | End: 2020-08-27
Attending: INTERNAL MEDICINE

## 2020-08-27 DIAGNOSIS — I42.9 CARDIOMYOPATHY (H): ICD-10-CM

## 2020-08-27 DIAGNOSIS — C50.919 PRIMARY MALIGNANT NEOPLASM OF FEMALE BREAST (H): ICD-10-CM

## 2020-08-27 DIAGNOSIS — Z01.818 EXAMINATION PRIOR TO CHEMOTHERAPY: ICD-10-CM

## 2020-08-27 LAB
AORTIC VALVE MEAN VELOCITY: 92.4 CM/S
AR DECEL SLOPE: 3750 MM/S2
AR PEAK VELOCITY: 386 CM/S
ASCENDING AORTA: 2.5 CM
AV DIMENSIONLESS INDEX VTI: 1
AV MEAN GRADIENT: 4 MMHG
AV PEAK GRADIENT: 7.8 MMHG
AV REGURGITANT PEAK GRADIENT: 59.6 MMHG
AV REGURGITATION PRESSURE HALF TIME: 359 MS
AV VALVE AREA: 3.3 CM2
AV VELOCITY RATIO: 0.8
BSA FOR ECHO PROCEDURE: 1.55 M2
CV ECHO HEIGHT: 65 IN
CV ECHO WEIGHT: 115 LBS
DOP CALC AO PEAK VEL: 140 CM/S
DOP CALC AO VTI: 26.3 CM
DOP CALC LVOT AREA: 3.14 CM2
DOP CALC LVOT DIAMETER: 2 CM
DOP CALC LVOT PEAK VEL: 115 CM/S
DOP CALC LVOT STROKE VOLUME: 85.7 CM3
DOP CALCLVOT PEAK VEL VTI: 27.3 CM
EJECTION FRACTION: 57 % (ref 55–75)
FRACTIONAL SHORTENING: 30.8 % (ref 28–44)
INTERVENTRICULAR SEPTUM IN END DIASTOLE: 0.9 CM (ref 0.6–0.9)
IVS/PW RATIO: 0.9
LA AREA 1: 10 CM2
LEFT ATRIUM LENGTH: 3.5 CM
LEFT VENTRICLE DIASTOLIC VOLUME INDEX: 33.5 CM3/M2 (ref 29–61)
LEFT VENTRICLE DIASTOLIC VOLUME: 51.9 CM3 (ref 46–106)
LEFT VENTRICLE MASS INDEX: 67.4 G/M2
LEFT VENTRICLE SYSTOLIC VOLUME INDEX: 14.3 CM3/M2 (ref 8–24)
LEFT VENTRICLE SYSTOLIC VOLUME: 22.1 CM3 (ref 14–42)
LEFT VENTRICULAR INTERNAL DIMENSION IN DIASTOLE: 3.77 CM (ref 3.8–5.2)
LEFT VENTRICULAR INTERNAL DIMENSION IN SYSTOLE: 2.61 CM (ref 2.2–3.5)
LEFT VENTRICULAR MASS: 104.5 G
LEFT VENTRICULAR OUTFLOW TRACT MEAN GRADIENT: 3 MMHG
LEFT VENTRICULAR OUTFLOW TRACT MEAN VELOCITY: 77.6 CM/S
LEFT VENTRICULAR OUTFLOW TRACT PEAK GRADIENT: 5 MMHG
LEFT VENTRICULAR POSTERIOR WALL IN END DIASTOLE: 0.96 CM (ref 0.6–0.9)
LV STROKE VOLUME INDEX: 55.3 ML/M2
MITRAL VALVE E/A RATIO: 0.8
MUGA LV EJECTION FRACTION: 67.3 %
MUGA PRIOR EJECTION FRACTION: 62.3 %
MV AVERAGE E/E' RATIO: 7.7 CM/S
MV DECELERATION TIME: 215 MS
MV E'TISSUE VEL-LAT: 10.8 CM/S
MV E'TISSUE VEL-MED: 9.67 CM/S
MV LATERAL E/E' RATIO: 7.3
MV MEDIAL E/E' RATIO: 8.1
MV PEAK A VELOCITY: 93.1 CM/S
MV PEAK E VELOCITY: 78.6 CM/S
NUC REST DIASTOLIC VOLUME INDEX: 1840 LBS
NUC REST SYSTOLIC VOLUME INDEX: 65 IN
PR MAX PG: 3 MMHG
PR PEAK VELOCITY: 90.9 CM/S
PR PEAK VELOCITY: 90.9 CM/S
TRICUSPID VALVE ANULAR PLANE SYSTOLIC EXCURSION: 2.1 CM

## 2020-08-27 ASSESSMENT — MIFFLIN-ST. JEOR: SCORE: 972.52

## 2020-08-28 ENCOUNTER — RECORDS - HEALTHEAST (OUTPATIENT)
Dept: ADMINISTRATIVE | Facility: OTHER | Age: 85
End: 2020-08-28

## 2020-11-20 ENCOUNTER — RECORDS - HEALTHEAST (OUTPATIENT)
Dept: ADMINISTRATIVE | Facility: OTHER | Age: 85
End: 2020-11-20

## 2020-12-11 ENCOUNTER — RECORDS - HEALTHEAST (OUTPATIENT)
Dept: ADMINISTRATIVE | Facility: OTHER | Age: 85
End: 2020-12-11

## 2021-01-22 ENCOUNTER — RECORDS - HEALTHEAST (OUTPATIENT)
Dept: ADMINISTRATIVE | Facility: OTHER | Age: 86
End: 2021-01-22

## 2021-04-12 ENCOUNTER — HOSPITAL ENCOUNTER (OUTPATIENT)
Dept: CARDIOLOGY | Facility: HOSPITAL | Age: 86
Discharge: HOME | End: 2021-04-12
Attending: INTERNAL MEDICINE

## 2021-04-12 DIAGNOSIS — I42.9 CARDIOMYOPATHY (H): ICD-10-CM

## 2021-04-12 LAB
AORTIC ROOT: 3.3 CM
AR DECEL SLOPE: 4030 MM/S2
AR PEAK VELOCITY: 437 CM/S
AV REGURGITANT PEAK GRADIENT: 76.4 MMHG
AV REGURGITATION PRESSURE HALF TIME: 318 MS
BSA FOR ECHO PROCEDURE: 1.55 M2
CV ECHO HEIGHT: 65 IN
CV ECHO WEIGHT: 115 LBS
DOP CALC LVOT AREA: 2.54 CM2
DOP CALC LVOT DIAMETER: 1.8 CM
DOP CALC LVOT PEAK VEL: 130 CM/S
DOP CALC LVOT STROKE VOLUME: 70.5 CM3
DOP CALCLVOT PEAK VEL VTI: 27.7 CM
EJECTION FRACTION: 62 % (ref 55–75)
FRACTIONAL SHORTENING: 32.9 % (ref 28–44)
INTERVENTRICULAR SEPTUM IN END DIASTOLE: 0.73 CM (ref 0.6–0.9)
IVS/PW RATIO: 1.1
LA AREA 1: 8.6 CM2
LA AREA 2: 6.2 CM2
LEFT ATRIUM LENGTH: 2.8 CM
LEFT ATRIUM VOLUME INDEX: 10.4 ML/M2
LEFT ATRIUM VOLUME: 16.2 ML
LEFT VENTRICLE CARDIAC INDEX: 3.6 L/MIN/M2
LEFT VENTRICLE CARDIAC OUTPUT: 5.6 L/MIN
LEFT VENTRICLE DIASTOLIC VOLUME INDEX: 27.4 CM3/M2 (ref 29–61)
LEFT VENTRICLE DIASTOLIC VOLUME: 42.4 CM3 (ref 46–106)
LEFT VENTRICLE HEART RATE: 80 BPM
LEFT VENTRICLE MASS INDEX: 51.8 G/M2
LEFT VENTRICLE SYSTOLIC VOLUME INDEX: 10.3 CM3/M2 (ref 8–24)
LEFT VENTRICLE SYSTOLIC VOLUME: 16 CM3 (ref 14–42)
LEFT VENTRICULAR INTERNAL DIMENSION IN DIASTOLE: 4.07 CM (ref 3.8–5.2)
LEFT VENTRICULAR INTERNAL DIMENSION IN SYSTOLE: 2.73 CM (ref 2.2–3.5)
LEFT VENTRICULAR MASS: 80.2 G
LEFT VENTRICULAR OUTFLOW TRACT MEAN GRADIENT: 3 MMHG
LEFT VENTRICULAR OUTFLOW TRACT MEAN VELOCITY: 76.4 CM/S
LEFT VENTRICULAR OUTFLOW TRACT PEAK GRADIENT: 7 MMHG
LEFT VENTRICULAR POSTERIOR WALL IN END DIASTOLE: 0.66 CM (ref 0.6–0.9)
LV STROKE VOLUME INDEX: 45.5 ML/M2
MITRAL REGURGITANT VELOCITY TIME INTEGRAL: 198 CM
MITRAL VALVE E/A RATIO: 0.6
MR FLOW: 10 CM3
MR MEAN GRADIENT: 101 MMHG
MR MEAN GRADIENT: 101 MMHG
MR MEAN VELOCITY: 477 CM/S
MR MEAN VELOCITY: 477 CM/S
MR PEAK GRADIENT: 138.3 MMHG
MR PISA EROA: 0.1 CM2
MR PISA RADIUS: 0.6 CM
MR PISA VN NYQUIST: 31 CM/S
MV AVERAGE E/E' RATIO: 7.9 CM/S
MV DECELERATION TIME: 250 MS
MV E'TISSUE VEL-LAT: 10.3 CM/S
MV E'TISSUE VEL-MED: 9.19 CM/S
MV LATERAL E/E' RATIO: 7.5
MV MEDIAL E/E' RATIO: 8.4
MV PEAK A VELOCITY: 122 CM/S
MV PEAK E VELOCITY: 77.1 CM/S
MV REGURGITANT VOLUME: 23.6 CC
NUC REST DIASTOLIC VOLUME INDEX: 1840 LBS
NUC REST SYSTOLIC VOLUME INDEX: 65 IN
PISA MR PEAK VEL: 588 CM/S
TRICUSPID REGURGITATION PEAK PRESSURE GRADIENT: 24.6 MMHG
TRICUSPID VALVE ANULAR PLANE SYSTOLIC EXCURSION: 2.2 CM
TRICUSPID VALVE PEAK REGURGITANT VELOCITY: 248 CM/S

## 2021-04-12 ASSESSMENT — MIFFLIN-ST. JEOR: SCORE: 972.52

## 2021-04-23 ENCOUNTER — RECORDS - HEALTHEAST (OUTPATIENT)
Dept: ADMINISTRATIVE | Facility: OTHER | Age: 86
End: 2021-04-23

## 2021-05-27 ENCOUNTER — RECORDS - HEALTHEAST (OUTPATIENT)
Dept: ADMINISTRATIVE | Facility: CLINIC | Age: 86
End: 2021-05-27

## 2021-05-30 VITALS — WEIGHT: 110 LBS | HEIGHT: 65 IN | BODY MASS INDEX: 18.33 KG/M2

## 2021-05-30 VITALS — BODY MASS INDEX: 18.33 KG/M2 | HEIGHT: 65 IN | WEIGHT: 110 LBS

## 2021-05-31 ENCOUNTER — RECORDS - HEALTHEAST (OUTPATIENT)
Dept: ADMINISTRATIVE | Facility: CLINIC | Age: 86
End: 2021-05-31

## 2021-05-31 VITALS — WEIGHT: 110 LBS | BODY MASS INDEX: 18.33 KG/M2 | HEIGHT: 65 IN

## 2021-05-31 VITALS — HEIGHT: 65 IN | WEIGHT: 110 LBS | BODY MASS INDEX: 18.33 KG/M2

## 2021-05-31 VITALS — HEIGHT: 65 IN | BODY MASS INDEX: 18.33 KG/M2 | WEIGHT: 110 LBS

## 2021-06-01 ENCOUNTER — RECORDS - HEALTHEAST (OUTPATIENT)
Dept: ADMINISTRATIVE | Facility: CLINIC | Age: 86
End: 2021-06-01

## 2021-06-01 VITALS — HEIGHT: 65 IN | WEIGHT: 110 LBS | BODY MASS INDEX: 18.33 KG/M2

## 2021-06-02 ENCOUNTER — RECORDS - HEALTHEAST (OUTPATIENT)
Dept: ADMINISTRATIVE | Facility: CLINIC | Age: 86
End: 2021-06-02

## 2021-06-04 VITALS — HEIGHT: 65 IN | BODY MASS INDEX: 19.16 KG/M2 | WEIGHT: 115 LBS

## 2021-06-05 VITALS — HEIGHT: 65 IN | BODY MASS INDEX: 19.16 KG/M2 | WEIGHT: 115 LBS

## 2021-06-12 ENCOUNTER — HEALTH MAINTENANCE LETTER (OUTPATIENT)
Age: 86
End: 2021-06-12

## 2021-06-16 PROBLEM — Z85.3 HX OF BREAST CANCER: Status: ACTIVE | Noted: 2017-10-17

## 2021-08-13 ENCOUNTER — HOSPITAL ENCOUNTER (OUTPATIENT)
Dept: CARDIOLOGY | Facility: HOSPITAL | Age: 86
Discharge: HOME | End: 2021-08-13
Attending: INTERNAL MEDICINE | Admitting: INTERNAL MEDICINE
Payer: MEDICARE

## 2021-08-13 DIAGNOSIS — Z01.818 EXAMINATION PRIOR TO CHEMOTHERAPY: ICD-10-CM

## 2021-08-13 LAB
BI-PLANE LVEF ECHO: NORMAL
LVEF ECHO: NORMAL

## 2021-08-13 PROCEDURE — 93321 DOPPLER ECHO F-UP/LMTD STD: CPT | Mod: 26 | Performed by: INTERNAL MEDICINE

## 2021-08-13 PROCEDURE — 93325 DOPPLER ECHO COLOR FLOW MAPG: CPT | Mod: 26 | Performed by: INTERNAL MEDICINE

## 2021-08-13 PROCEDURE — 93308 TTE F-UP OR LMTD: CPT

## 2021-08-13 PROCEDURE — 93308 TTE F-UP OR LMTD: CPT | Mod: 26 | Performed by: INTERNAL MEDICINE

## 2021-10-02 ENCOUNTER — HEALTH MAINTENANCE LETTER (OUTPATIENT)
Age: 86
End: 2021-10-02

## 2021-10-08 ENCOUNTER — TRANSFERRED RECORDS (OUTPATIENT)
Dept: HEALTH INFORMATION MANAGEMENT | Facility: CLINIC | Age: 86
End: 2021-10-08

## 2021-12-28 ENCOUNTER — HOSPITAL ENCOUNTER (OUTPATIENT)
Dept: NUCLEAR MEDICINE | Facility: HOSPITAL | Age: 86
End: 2021-12-28
Attending: INTERNAL MEDICINE
Payer: MEDICARE

## 2021-12-28 DIAGNOSIS — C50.411 MALIGNANT NEOPLASM OF UPPER-OUTER QUADRANT OF RIGHT FEMALE BREAST (H): ICD-10-CM

## 2021-12-28 PROCEDURE — 78306 BONE IMAGING WHOLE BODY: CPT

## 2021-12-28 PROCEDURE — A9503 TC99M MEDRONATE: HCPCS | Performed by: INTERNAL MEDICINE

## 2021-12-28 PROCEDURE — 343N000001 HC RX 343: Performed by: INTERNAL MEDICINE

## 2021-12-28 PROCEDURE — 250N000011 HC RX IP 250 OP 636: Performed by: INTERNAL MEDICINE

## 2021-12-28 RX ORDER — HEPARIN SODIUM (PORCINE) LOCK FLUSH IV SOLN 100 UNIT/ML 100 UNIT/ML
500 SOLUTION INTRAVENOUS ONCE
Status: COMPLETED | OUTPATIENT
Start: 2021-12-28 | End: 2021-12-28

## 2021-12-28 RX ORDER — TC 99M MEDRONATE 20 MG/10ML
20-30 INJECTION, POWDER, LYOPHILIZED, FOR SOLUTION INTRAVENOUS ONCE
Status: COMPLETED | OUTPATIENT
Start: 2021-12-28 | End: 2021-12-28

## 2021-12-28 RX ADMIN — HEPARIN 500 UNITS: 100 SYRINGE at 10:29

## 2021-12-28 RX ADMIN — TC 99M MEDRONATE 24.8 MCI.: 20 INJECTION, POWDER, LYOPHILIZED, FOR SOLUTION INTRAVENOUS at 06:49

## 2021-12-28 NOTE — IR NOTE
Pt port was accessed yesterday at her Oncologist and left in place for NucMed appt today. Port Heparinized and deaccess without problem.

## 2021-12-29 ENCOUNTER — TRANSFERRED RECORDS (OUTPATIENT)
Dept: HEALTH INFORMATION MANAGEMENT | Facility: CLINIC | Age: 86
End: 2021-12-29
Payer: MEDICARE

## 2022-01-14 ENCOUNTER — TRANSFERRED RECORDS (OUTPATIENT)
Dept: HEALTH INFORMATION MANAGEMENT | Facility: CLINIC | Age: 87
End: 2022-01-14
Payer: MEDICARE

## 2022-02-02 ENCOUNTER — HOSPITAL ENCOUNTER (OUTPATIENT)
Dept: CARDIOLOGY | Facility: HOSPITAL | Age: 87
Discharge: HOME | End: 2022-02-02
Attending: INTERNAL MEDICINE | Admitting: INTERNAL MEDICINE
Payer: MEDICARE

## 2022-02-02 DIAGNOSIS — Z01.818 EXAMINATION PRIOR TO CHEMOTHERAPY: ICD-10-CM

## 2022-02-02 PROCEDURE — 93325 DOPPLER ECHO COLOR FLOW MAPG: CPT

## 2022-02-02 PROCEDURE — 93308 TTE F-UP OR LMTD: CPT | Mod: 26 | Performed by: INTERNAL MEDICINE

## 2022-02-02 PROCEDURE — 93321 DOPPLER ECHO F-UP/LMTD STD: CPT | Mod: 26 | Performed by: INTERNAL MEDICINE

## 2022-02-02 PROCEDURE — 93321 DOPPLER ECHO F-UP/LMTD STD: CPT

## 2022-02-02 PROCEDURE — 93325 DOPPLER ECHO COLOR FLOW MAPG: CPT | Mod: 26 | Performed by: INTERNAL MEDICINE

## 2022-03-18 ENCOUNTER — TRANSFERRED RECORDS (OUTPATIENT)
Dept: HEALTH INFORMATION MANAGEMENT | Facility: CLINIC | Age: 87
End: 2022-03-18
Payer: MEDICARE

## 2022-06-05 ENCOUNTER — RECORDS - HEALTHEAST (OUTPATIENT)
Dept: ADMINISTRATIVE | Facility: OTHER | Age: 87
End: 2022-06-05

## 2022-07-09 ENCOUNTER — HEALTH MAINTENANCE LETTER (OUTPATIENT)
Age: 87
End: 2022-07-09

## 2022-07-22 ENCOUNTER — TRANSFERRED RECORDS (OUTPATIENT)
Dept: HEALTH INFORMATION MANAGEMENT | Facility: CLINIC | Age: 87
End: 2022-07-22

## 2022-08-17 ENCOUNTER — HOSPITAL ENCOUNTER (OUTPATIENT)
Dept: NUCLEAR MEDICINE | Facility: HOSPITAL | Age: 87
Discharge: HOME | End: 2022-08-17
Attending: INTERNAL MEDICINE
Payer: MEDICARE

## 2022-08-17 DIAGNOSIS — C50.411 MALIGNANT NEOPLASM OF UPPER-OUTER QUADRANT OF RIGHT FEMALE BREAST (H): ICD-10-CM

## 2022-08-17 PROCEDURE — 78306 BONE IMAGING WHOLE BODY: CPT

## 2022-08-17 PROCEDURE — 343N000001 HC RX 343: Performed by: INTERNAL MEDICINE

## 2022-08-17 PROCEDURE — A9503 TC99M MEDRONATE: HCPCS | Performed by: INTERNAL MEDICINE

## 2022-08-17 RX ORDER — TC 99M MEDRONATE 20 MG/10ML
23-27 INJECTION, POWDER, LYOPHILIZED, FOR SOLUTION INTRAVENOUS ONCE
Status: COMPLETED | OUTPATIENT
Start: 2022-08-17 | End: 2022-08-17

## 2022-08-17 RX ADMIN — TC 99M MEDRONATE 26.6 MCI.: 20 INJECTION, POWDER, LYOPHILIZED, FOR SOLUTION INTRAVENOUS at 09:09

## 2022-09-03 ENCOUNTER — HOSPITAL ENCOUNTER (OUTPATIENT)
Dept: CARDIOLOGY | Facility: HOSPITAL | Age: 87
Discharge: HOME | End: 2022-09-03
Attending: INTERNAL MEDICINE | Admitting: INTERNAL MEDICINE
Payer: MEDICARE

## 2022-09-03 ENCOUNTER — HEALTH MAINTENANCE LETTER (OUTPATIENT)
Age: 87
End: 2022-09-03

## 2022-09-03 DIAGNOSIS — Z01.818 EXAMINATION PRIOR TO CHEMOTHERAPY: ICD-10-CM

## 2022-09-03 LAB — LVEF ECHO: NORMAL

## 2022-09-03 PROCEDURE — 93306 TTE W/DOPPLER COMPLETE: CPT | Mod: 26 | Performed by: INTERNAL MEDICINE

## 2022-09-03 PROCEDURE — 93306 TTE W/DOPPLER COMPLETE: CPT

## 2022-12-29 ENCOUNTER — HOSPITAL ENCOUNTER (OUTPATIENT)
Dept: CARDIOLOGY | Facility: CLINIC | Age: 87
Discharge: HOME | End: 2022-12-29
Attending: INTERNAL MEDICINE | Admitting: INTERNAL MEDICINE
Payer: MEDICARE

## 2022-12-29 DIAGNOSIS — Z01.818 EXAMINATION PRIOR TO CHEMOTHERAPY: ICD-10-CM

## 2022-12-29 LAB
BI-PLANE LVEF ECHO: NORMAL
LVEF ECHO: NORMAL

## 2022-12-29 PROCEDURE — 93308 TTE F-UP OR LMTD: CPT | Mod: 26 | Performed by: INTERNAL MEDICINE

## 2022-12-29 PROCEDURE — 93308 TTE F-UP OR LMTD: CPT

## 2022-12-29 PROCEDURE — 93325 DOPPLER ECHO COLOR FLOW MAPG: CPT | Mod: 26 | Performed by: INTERNAL MEDICINE

## 2022-12-29 PROCEDURE — 93325 DOPPLER ECHO COLOR FLOW MAPG: CPT

## 2023-03-08 ENCOUNTER — HOSPITAL ENCOUNTER (OUTPATIENT)
Dept: NUCLEAR MEDICINE | Facility: HOSPITAL | Age: 88
Discharge: HOME | End: 2023-03-08
Attending: INTERNAL MEDICINE
Payer: MEDICARE

## 2023-03-08 DIAGNOSIS — C50.411 PRIMARY MALIGNANT NEOPLASM OF UPPER OUTER QUADRANT OF FEMALE BREAST, RIGHT (H): ICD-10-CM

## 2023-03-08 PROCEDURE — A9503 TC99M MEDRONATE: HCPCS | Performed by: INTERNAL MEDICINE

## 2023-03-08 PROCEDURE — 78306 BONE IMAGING WHOLE BODY: CPT

## 2023-03-08 PROCEDURE — 343N000001 HC RX 343: Performed by: INTERNAL MEDICINE

## 2023-03-08 RX ORDER — TC 99M MEDRONATE 20 MG/10ML
15-30 INJECTION, POWDER, LYOPHILIZED, FOR SOLUTION INTRAVENOUS ONCE
Status: COMPLETED | OUTPATIENT
Start: 2023-03-08 | End: 2023-03-08

## 2023-03-08 RX ADMIN — TC 99M MEDRONATE 26.7 MILLICURIE: 20 INJECTION, POWDER, LYOPHILIZED, FOR SOLUTION INTRAVENOUS at 08:11

## 2023-05-03 ENCOUNTER — HOSPITAL ENCOUNTER (OUTPATIENT)
Dept: CARDIOLOGY | Facility: HOSPITAL | Age: 88
Discharge: HOME | End: 2023-05-03
Attending: PHYSICIAN ASSISTANT | Admitting: PHYSICIAN ASSISTANT
Payer: MEDICARE

## 2023-05-03 DIAGNOSIS — Z01.818 EXAMINATION PRIOR TO CHEMOTHERAPY: ICD-10-CM

## 2023-05-03 LAB — BI-PLANE LVEF ECHO: NORMAL

## 2023-05-03 PROCEDURE — 93325 DOPPLER ECHO COLOR FLOW MAPG: CPT

## 2023-05-03 PROCEDURE — 93325 DOPPLER ECHO COLOR FLOW MAPG: CPT | Mod: 26 | Performed by: GENERAL ACUTE CARE HOSPITAL

## 2023-05-03 PROCEDURE — 93321 DOPPLER ECHO F-UP/LMTD STD: CPT | Mod: 26 | Performed by: GENERAL ACUTE CARE HOSPITAL

## 2023-05-03 PROCEDURE — 93321 DOPPLER ECHO F-UP/LMTD STD: CPT

## 2023-05-03 PROCEDURE — 93308 TTE F-UP OR LMTD: CPT | Mod: 26 | Performed by: GENERAL ACUTE CARE HOSPITAL

## 2023-07-06 ENCOUNTER — HOSPITAL ENCOUNTER (OUTPATIENT)
Dept: CARDIOLOGY | Facility: HOSPITAL | Age: 88
Discharge: HOME | End: 2023-07-06
Attending: INTERNAL MEDICINE | Admitting: INTERNAL MEDICINE
Payer: MEDICARE

## 2023-07-06 DIAGNOSIS — Z01.818 EXAMINATION PRIOR TO CHEMOTHERAPY: ICD-10-CM

## 2023-07-06 LAB — LVEF ECHO: NORMAL

## 2023-07-06 PROCEDURE — 93321 DOPPLER ECHO F-UP/LMTD STD: CPT

## 2023-07-06 PROCEDURE — 93325 DOPPLER ECHO COLOR FLOW MAPG: CPT

## 2023-07-06 PROCEDURE — 93356 MYOCRD STRAIN IMG SPCKL TRCK: CPT | Performed by: INTERNAL MEDICINE

## 2023-07-06 PROCEDURE — 93308 TTE F-UP OR LMTD: CPT | Mod: 26 | Performed by: INTERNAL MEDICINE

## 2023-07-06 PROCEDURE — 93325 DOPPLER ECHO COLOR FLOW MAPG: CPT | Mod: 26 | Performed by: INTERNAL MEDICINE

## 2023-07-06 PROCEDURE — 93321 DOPPLER ECHO F-UP/LMTD STD: CPT | Mod: 26 | Performed by: INTERNAL MEDICINE

## 2023-07-06 PROCEDURE — 93356 MYOCRD STRAIN IMG SPCKL TRCK: CPT

## 2023-07-22 ENCOUNTER — HEALTH MAINTENANCE LETTER (OUTPATIENT)
Age: 88
End: 2023-07-22

## 2023-10-30 ENCOUNTER — HOSPITAL ENCOUNTER (OUTPATIENT)
Dept: CARDIOLOGY | Facility: HOSPITAL | Age: 88
Discharge: HOME | End: 2023-10-30
Attending: PHYSICIAN ASSISTANT | Admitting: PHYSICIAN ASSISTANT
Payer: MEDICARE

## 2023-10-30 DIAGNOSIS — Z01.818 EXAMINATION PRIOR TO CHEMOTHERAPY: ICD-10-CM

## 2023-10-30 LAB — LVEF ECHO: NORMAL

## 2023-10-30 PROCEDURE — 93325 DOPPLER ECHO COLOR FLOW MAPG: CPT

## 2023-10-30 PROCEDURE — 93325 DOPPLER ECHO COLOR FLOW MAPG: CPT | Mod: 26 | Performed by: INTERNAL MEDICINE

## 2023-10-30 PROCEDURE — 93308 TTE F-UP OR LMTD: CPT | Mod: 26 | Performed by: INTERNAL MEDICINE

## 2023-10-30 PROCEDURE — 93321 DOPPLER ECHO F-UP/LMTD STD: CPT | Mod: 26 | Performed by: INTERNAL MEDICINE

## 2023-10-30 PROCEDURE — 93356 MYOCRD STRAIN IMG SPCKL TRCK: CPT | Performed by: INTERNAL MEDICINE

## 2024-01-31 ENCOUNTER — HOSPITAL ENCOUNTER (OUTPATIENT)
Dept: CARDIOLOGY | Facility: HOSPITAL | Age: 89
Discharge: HOME | End: 2024-01-31
Attending: INTERNAL MEDICINE | Admitting: INTERNAL MEDICINE
Payer: MEDICARE

## 2024-01-31 DIAGNOSIS — Z01.818 EXAMINATION PRIOR TO CHEMOTHERAPY: ICD-10-CM

## 2024-01-31 LAB
BI-PLANE LVEF ECHO: NORMAL
LVEF ECHO: NORMAL

## 2024-01-31 PROCEDURE — 93306 TTE W/DOPPLER COMPLETE: CPT | Mod: 26 | Performed by: INTERNAL MEDICINE

## 2024-01-31 PROCEDURE — 93356 MYOCRD STRAIN IMG SPCKL TRCK: CPT | Performed by: INTERNAL MEDICINE

## 2024-01-31 PROCEDURE — 93356 MYOCRD STRAIN IMG SPCKL TRCK: CPT

## 2024-02-17 ENCOUNTER — HEALTH MAINTENANCE LETTER (OUTPATIENT)
Age: 89
End: 2024-02-17

## 2024-07-10 ENCOUNTER — HOSPITAL ENCOUNTER (OUTPATIENT)
Dept: CARDIOLOGY | Facility: HOSPITAL | Age: 89
Discharge: HOME | End: 2024-07-10
Attending: PHYSICIAN ASSISTANT | Admitting: PHYSICIAN ASSISTANT
Payer: MEDICARE

## 2024-07-10 DIAGNOSIS — Z01.818 EXAMINATION PRIOR TO CHEMOTHERAPY: ICD-10-CM

## 2024-07-10 LAB — LVEF ECHO: NORMAL

## 2024-07-10 PROCEDURE — 93325 DOPPLER ECHO COLOR FLOW MAPG: CPT

## 2024-07-10 PROCEDURE — 93321 DOPPLER ECHO F-UP/LMTD STD: CPT | Mod: 26 | Performed by: INTERNAL MEDICINE

## 2024-07-10 PROCEDURE — 93356 MYOCRD STRAIN IMG SPCKL TRCK: CPT | Performed by: INTERNAL MEDICINE

## 2024-07-10 PROCEDURE — 93308 TTE F-UP OR LMTD: CPT | Mod: 26 | Performed by: INTERNAL MEDICINE

## 2024-07-10 PROCEDURE — 93325 DOPPLER ECHO COLOR FLOW MAPG: CPT | Mod: 26 | Performed by: INTERNAL MEDICINE

## 2024-09-14 ENCOUNTER — HEALTH MAINTENANCE LETTER (OUTPATIENT)
Age: 89
End: 2024-09-14

## 2025-01-16 ENCOUNTER — HOSPITAL ENCOUNTER (OUTPATIENT)
Dept: CARDIOLOGY | Facility: HOSPITAL | Age: 89
End: 2025-01-16
Attending: INTERNAL MEDICINE
Payer: MEDICARE

## 2025-01-16 DIAGNOSIS — Z01.818 EXAMINATION PRIOR TO CHEMOTHERAPY: ICD-10-CM

## 2025-01-16 LAB — LVEF ECHO: NORMAL

## 2025-01-16 PROCEDURE — 93356 MYOCRD STRAIN IMG SPCKL TRCK: CPT

## 2025-01-16 PROCEDURE — 93325 DOPPLER ECHO COLOR FLOW MAPG: CPT

## 2025-03-08 ENCOUNTER — HEALTH MAINTENANCE LETTER (OUTPATIENT)
Age: 89
End: 2025-03-08

## 2025-04-20 ENCOUNTER — HEALTH MAINTENANCE LETTER (OUTPATIENT)
Age: 89
End: 2025-04-20

## 2025-05-15 ENCOUNTER — HOSPITAL ENCOUNTER (OUTPATIENT)
Dept: CARDIOLOGY | Facility: HOSPITAL | Age: 89
End: 2025-05-15
Attending: INTERNAL MEDICINE
Payer: MEDICARE

## 2025-05-15 DIAGNOSIS — Z01.818 EXAMINATION PRIOR TO CHEMOTHERAPY: ICD-10-CM

## 2025-05-15 LAB — LVEF ECHO: NORMAL

## 2025-05-15 PROCEDURE — 93308 TTE F-UP OR LMTD: CPT

## 2025-08-18 ENCOUNTER — TRANSCRIBE ORDERS (OUTPATIENT)
Dept: VASCULAR SURGERY | Facility: CLINIC | Age: 89
End: 2025-08-18
Payer: MEDICARE

## 2025-08-18 DIAGNOSIS — S31.819A BUTTOCK WOUND, RIGHT, INITIAL ENCOUNTER: Primary | ICD-10-CM

## 2025-08-19 ENCOUNTER — TELEPHONE (OUTPATIENT)
Dept: VASCULAR SURGERY | Facility: CLINIC | Age: 89
End: 2025-08-19
Payer: MEDICARE

## (undated) RX ORDER — HEPARIN SODIUM (PORCINE) LOCK FLUSH IV SOLN 100 UNIT/ML 100 UNIT/ML
SOLUTION INTRAVENOUS
Status: DISPENSED
Start: 2021-12-28